# Patient Record
Sex: MALE | Race: WHITE
[De-identification: names, ages, dates, MRNs, and addresses within clinical notes are randomized per-mention and may not be internally consistent; named-entity substitution may affect disease eponyms.]

---

## 2017-04-17 ENCOUNTER — HOSPITAL ENCOUNTER (OUTPATIENT)
Dept: HOSPITAL 84 - D.CT | Age: 82
Discharge: HOME | End: 2017-04-17
Attending: FAMILY MEDICINE
Payer: MEDICARE

## 2017-04-17 VITALS — BODY MASS INDEX: 22.8 KG/M2
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
## 2017-04-17 DIAGNOSIS — S09.90XA: Primary | ICD-10-CM
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
## 2019-10-22 ENCOUNTER — HOSPITAL ENCOUNTER (INPATIENT)
Dept: HOSPITAL 84 - D.ER | Age: 84
LOS: 1 days | DRG: 64 | End: 2019-10-23
Attending: INTERNAL MEDICINE | Admitting: INTERNAL MEDICINE
Payer: MEDICARE

## 2019-10-22 VITALS — DIASTOLIC BLOOD PRESSURE: 102 MMHG | SYSTOLIC BLOOD PRESSURE: 153 MMHG

## 2019-10-22 VITALS
WEIGHT: 119.25 LBS | BODY MASS INDEX: 17.66 KG/M2 | BODY MASS INDEX: 17.66 KG/M2 | HEIGHT: 69 IN | WEIGHT: 119.25 LBS | HEIGHT: 69 IN

## 2019-10-22 VITALS — SYSTOLIC BLOOD PRESSURE: 168 MMHG | DIASTOLIC BLOOD PRESSURE: 109 MMHG

## 2019-10-22 VITALS — DIASTOLIC BLOOD PRESSURE: 118 MMHG | SYSTOLIC BLOOD PRESSURE: 179 MMHG

## 2019-10-22 VITALS — DIASTOLIC BLOOD PRESSURE: 73 MMHG | SYSTOLIC BLOOD PRESSURE: 110 MMHG

## 2019-10-22 VITALS — DIASTOLIC BLOOD PRESSURE: 102 MMHG | SYSTOLIC BLOOD PRESSURE: 159 MMHG

## 2019-10-22 VITALS — DIASTOLIC BLOOD PRESSURE: 108 MMHG | SYSTOLIC BLOOD PRESSURE: 160 MMHG

## 2019-10-22 VITALS — SYSTOLIC BLOOD PRESSURE: 109 MMHG | DIASTOLIC BLOOD PRESSURE: 75 MMHG

## 2019-10-22 VITALS — DIASTOLIC BLOOD PRESSURE: 105 MMHG | SYSTOLIC BLOOD PRESSURE: 174 MMHG

## 2019-10-22 VITALS — SYSTOLIC BLOOD PRESSURE: 148 MMHG | DIASTOLIC BLOOD PRESSURE: 98 MMHG

## 2019-10-22 DIAGNOSIS — R40.2113: ICD-10-CM

## 2019-10-22 DIAGNOSIS — R40.2333: ICD-10-CM

## 2019-10-22 DIAGNOSIS — R50.9: ICD-10-CM

## 2019-10-22 DIAGNOSIS — R40.2213: ICD-10-CM

## 2019-10-22 DIAGNOSIS — I10: ICD-10-CM

## 2019-10-22 DIAGNOSIS — E78.5: ICD-10-CM

## 2019-10-22 DIAGNOSIS — J96.01: ICD-10-CM

## 2019-10-22 DIAGNOSIS — I62.9: Primary | ICD-10-CM

## 2019-10-22 LAB
ALBUMIN SERPL-MCNC: 3.9 G/DL (ref 3.4–5)
ALP SERPL-CCNC: 105 U/L (ref 46–116)
ALT SERPL-CCNC: 27 U/L (ref 10–68)
AMPHETAMINES UR QL SCN: NEGATIVE QUAL
ANION GAP SERPL CALC-SCNC: 10.2 MMOL/L (ref 8–16)
APPEARANCE UR: CLEAR
APTT BLD: 25.5 SECONDS (ref 22.8–39.4)
BARBITURATES UR QL SCN: NEGATIVE QUAL
BASOPHILS NFR BLD AUTO: 0.3 % (ref 0–2)
BENZODIAZ UR QL SCN: NEGATIVE QUAL
BILIRUB SERPL-MCNC: 0.81 MG/DL (ref 0.2–1.3)
BILIRUB SERPL-MCNC: NEGATIVE MG/DL
BUN SERPL-MCNC: 8 MG/DL (ref 7–18)
BZE UR QL SCN: NEGATIVE QUAL
CALCIUM SERPL-MCNC: 8.4 MG/DL (ref 8.5–10.1)
CANNABINOIDS UR QL SCN: NEGATIVE QUAL
CHLORIDE SERPL-SCNC: 100 MMOL/L (ref 98–107)
CK MB SERPL-MCNC: 1.8 U/L (ref 0–3.6)
CK SERPL-CCNC: 132 UL (ref 21–232)
CO2 SERPL-SCNC: 31.3 MMOL/L (ref 21–32)
COLOR UR: YELLOW
CREAT SERPL-MCNC: 0.6 MG/DL (ref 0.6–1.3)
EOSINOPHIL NFR BLD: 4.8 % (ref 0–7)
ERYTHROCYTE [DISTWIDTH] IN BLOOD BY AUTOMATED COUNT: 13.5 % (ref 11.5–14.5)
GLOBULIN SER-MCNC: 3.8 G/L
GLUCOSE SERPL-MCNC: 142 MG/DL (ref 74–106)
GLUCOSE SERPL-MCNC: NEGATIVE MG/DL
HCT VFR BLD CALC: 41.4 % (ref 42–54)
HGB BLD-MCNC: 13.6 G/DL (ref 13.5–17.5)
IMM GRANULOCYTES NFR BLD: 0.3 % (ref 0–5)
INR PPP: 0.98 (ref 0.85–1.17)
KETONES UR STRIP-MCNC: NEGATIVE MG/DL
LYMPHOCYTES NFR BLD AUTO: 26.7 % (ref 15–50)
MAGNESIUM SERPL-MCNC: 2.2 MG/DL (ref 1.8–2.4)
MCH RBC QN AUTO: 33.3 PG (ref 26–34)
MCHC RBC AUTO-ENTMCNC: 32.9 G/DL (ref 31–37)
MCV RBC: 101.5 FL (ref 80–100)
MONOCYTES NFR BLD: 9.8 % (ref 2–11)
NEUTROPHILS NFR BLD AUTO: 58.1 % (ref 40–80)
NITRITE UR-MCNC: NEGATIVE MG/ML
OPIATES UR QL SCN: NEGATIVE QUAL
OSMOLALITY SERPL CALC.SUM OF ELEC: 275 MOSM/KG (ref 275–300)
PCP UR QL SCN: NEGATIVE QUAL
PH UR STRIP: 8 [PH] (ref 5–6)
PLATELET # BLD: 141 10X3/UL (ref 130–400)
PMV BLD AUTO: 8.7 FL (ref 7.4–10.4)
POTASSIUM SERPL-SCNC: 3.5 MMOL/L (ref 3.5–5.1)
PROT SERPL-MCNC: 7.7 G/DL (ref 6.4–8.2)
PROT UR-MCNC: NEGATIVE MG/DL
PROTHROMBIN TIME: 12.5 SECONDS (ref 11.6–15)
RBC # BLD AUTO: 4.08 10X6/UL (ref 4.2–6.1)
SODIUM SERPL-SCNC: 138 MMOL/L (ref 136–145)
SP GR UR STRIP: 1.01 (ref 1–1.02)
TROPONIN I SERPL-MCNC: < 0.017 NG/ML (ref 0–0.06)
TSH SERPL-ACNC: 1.3 UIU/ML (ref 0.36–3.74)
UROBILINOGEN UR-MCNC: NORMAL MG/DL
WBC # BLD AUTO: 6.2 10X3/UL (ref 4.8–10.8)

## 2019-10-22 PROCEDURE — 5A1935Z RESPIRATORY VENTILATION, LESS THAN 24 CONSECUTIVE HOURS: ICD-10-PCS | Performed by: FAMILY MEDICINE

## 2019-10-22 PROCEDURE — 0BH17EZ INSERTION OF ENDOTRACHEAL AIRWAY INTO TRACHEA, VIA NATURAL OR ARTIFICIAL OPENING: ICD-10-PCS | Performed by: FAMILY MEDICINE

## 2019-10-22 NOTE — NUR
spoke with christiano dang to terminate life support and call back with time of
cardiac expiration for possible tissue screening

## 2019-10-22 NOTE — NUR
NARCAN ADMINISTERED WITH NO CHANGE IN MENTAL STATUS.  DR LANDIN AT BEDSIDE AND
WILL NOW PREPARE TO INTUBATE.

## 2019-10-22 NOTE — NUR
RECEIVED PT ON BED BY [facility] FROM ER. PT INTUBATED, SEE FLOWSHEET FOR
VENT SETTINGS. ADMISSION ASSESSMENT COMPLETE. L HAND PIV SALINE LOCKED, R HAND
PIV PATENT, SEE IV FLOW SHEET. B/P 153/102, HR 78, O2 100%. PT UNRESPOSIVE,
SEDATED. WILL CONTINUE PLAN OF CARE.

## 2019-10-22 NOTE — NUR
BILATERAL SOFT WRIST RESTRAINTS APPLIED.  FLOW SHEET BEGAN.  DR DEVINE HERE TO
SEE PT AND SPEAK TO SPOUSE.

## 2019-10-22 NOTE — NUR
PT ARRIVED VIA EMS WITH AMS.  PT ARRIVED WITH AGONAL RESPIRATIONS AND
UNRESPONSIVE.  EMS STATES HIS CONDITION WORSENED WITH RESPIRATONS EN ROUTE. 
OXYGEN ON PT AT 6 LITERS.  IV SL TO LEFT HAND IN PLACE.  MED LIST DOES NOT SHOW
BLOOD THINNERS, ONLY ASA 81MG. EMS STATES PT WAS ALERT AND ORIENTED AND
ACTUALLY DROVE TO [place] AND BACK TWICE TODAY.  AT APPROX 1800 WIFE STATES
HE WENT TO THE BEDROOM AND AT 1820 WENT TO CHECK ON HIM AND HE WAS SEVERLY
ALTERED AND UNABLE TO AWAKEN PATIENT.

## 2019-10-22 NOTE — NUR
SPOKE WITH ER, THEY ARE BRINGING PT'S WIFE TO VISIT. SHE IS STILL DISTRAUGHT
AND WILL MORE THAN LIKELY NOT BE ABLE TO ANSWER MANY QUESTIONS. WILL ATEMPT TO
GET MEDICAL HX FROM FAMILY.

## 2019-10-23 VITALS — SYSTOLIC BLOOD PRESSURE: 160 MMHG | DIASTOLIC BLOOD PRESSURE: 109 MMHG

## 2019-10-23 VITALS — DIASTOLIC BLOOD PRESSURE: 113 MMHG | SYSTOLIC BLOOD PRESSURE: 188 MMHG

## 2019-10-23 VITALS — SYSTOLIC BLOOD PRESSURE: 165 MMHG | DIASTOLIC BLOOD PRESSURE: 107 MMHG

## 2019-10-23 VITALS — DIASTOLIC BLOOD PRESSURE: 110 MMHG | SYSTOLIC BLOOD PRESSURE: 168 MMHG

## 2019-10-23 VITALS — SYSTOLIC BLOOD PRESSURE: 121 MMHG | DIASTOLIC BLOOD PRESSURE: 85 MMHG

## 2019-10-23 VITALS — DIASTOLIC BLOOD PRESSURE: 109 MMHG | SYSTOLIC BLOOD PRESSURE: 174 MMHG

## 2019-10-23 VITALS — SYSTOLIC BLOOD PRESSURE: 114 MMHG | DIASTOLIC BLOOD PRESSURE: 82 MMHG

## 2019-10-23 VITALS — DIASTOLIC BLOOD PRESSURE: 90 MMHG | SYSTOLIC BLOOD PRESSURE: 131 MMHG

## 2019-10-23 VITALS — SYSTOLIC BLOOD PRESSURE: 171 MMHG | DIASTOLIC BLOOD PRESSURE: 114 MMHG

## 2019-10-23 VITALS — SYSTOLIC BLOOD PRESSURE: 139 MMHG | DIASTOLIC BLOOD PRESSURE: 96 MMHG

## 2019-10-23 VITALS — DIASTOLIC BLOOD PRESSURE: 95 MMHG | SYSTOLIC BLOOD PRESSURE: 128 MMHG

## 2019-10-23 VITALS — DIASTOLIC BLOOD PRESSURE: 90 MMHG | SYSTOLIC BLOOD PRESSURE: 135 MMHG

## 2019-10-23 NOTE — NUR
REPORT RECIEVED, SHIFT ASSESSMENT COMPLETE, PT IS UNRESPONSIVE ON VENT, ON 40%
FIO2 WITH 98% O2 SAT, ALL PPP, VSS, WILL CON'T TO MONITOR

## 2019-10-23 NOTE — [facility]
CASE MANAGEMENT DISCHARGE SUMMARY
 
 
PATIENT: FARZANEH DELEON                 UNIT: [id number]
ACCOUNT#: [id number]                       ADM DATE: 10/22/19
AGE: 88     : 31  SEX: M            ROOM/BED: D.2309    
AUTHOR: BECKY HOLMAN                             PHYSICIAN:                               
 
REFERRING PHYSICIAN: JORDIN LORA MD               
DATE OF SERVICE: 10/23/19
Discharge Plan
 
 
Patient Name: FARZANEH DELEON
[facility]
Encounter #: [id number]
Medical Record #: [id number]
: 1931
Planned Disposition: 
Anticipated Discharge Date: 
 
Discharge Date: 10/23/2019
Expected LOS: 
Initial Reviewer: [email or web address]
Initial Review Date: 10/23/2019
Generated: 10/23/19   4:11 pm 
Comments
 
DCP- Discharge Planning
 
Updated by [email or web address]: Keshia Peterson on 10/23/19   2:09 pm CT
CM was notified that patient had  and was registered with [employer] [employer] 
whole body donation. Family had gave nursing a contact number to call.  CM 
called and answered questions related to patient and death along with family 
contact numbers. Danielle stated that they would contact [facility] to come and  body.
  
 
 
 
 
 
 
 
 
Last DP export: 10/23/19   1:52 
Patient Name: FARZANEH DELEON
 
Encounter #: [id number]
Page [id number]
 
 
 
 
 
Electronically Signed by BECKY HOLMAN on 10/23/19 at 1511
 
 
 
 
 
 
**All edits/amendments must be made on the electronic document**
 
DICTATION DATE: 10/23/19 1511     : ASAD  10/23/19 1511     
RPT#: [id number]                                DC DATE:10/23/19
                                               STATUS: DIS IN  
[facility]
[address] [place] [address]
***END OF REPORT***

## 2019-10-23 NOTE — NUR
MARYELLEN GILLETTE, PT B/P 188/113, INFORMED OF EMESIS, ORDERS RECIEVED,
GIVEN ZOFRAN 8 MG IVP, FAMILY @ BEDSIDE

## 2019-10-23 NOTE — [facility]ECARE
CASE MANAGEMENT DISCHARGE SUMMARY
 
 
PATIENT: FARZANEH DELEON                 UNIT: [id number]
ACCOUNT#: [id number]                       ADM DATE: 10/22/19
AGE: 88     : 31  SEX: M            ROOM/BED: D.2309    
AUTHOR: BECKY HOLMAN                             PHYSICIAN:                               
 
REFERRING PHYSICIAN: JORDIN LORA MD               
DATE OF SERVICE: 10/23/19
Discharge Plan
 
 
Patient Name: FARZANEH DELEON
[facility]
Encounter #: [id number]
Medical Record #: [id number]
: 1931
Planned Disposition: 
Anticipated Discharge Date: 
 
Discharge Date: 10/23/2019
Expected LOS: 
Initial Reviewer: [email or web address]
Initial Review Date: 10/23/2019
Generated: 10/23/19   3:52 pm 
  
 
 
 
 
 
 
 
Patient Name: FARZANEH DELEON
 
Encounter #: [id number]
Page [id number]
 
 
 
 
 
Electronically Signed by BECKY HOLMAN on 10/23/19 at 1452
 
 
 
 
 
 
**All edits/amendments must be made on the electronic document**
 
DICTATION DATE: 10/23/19 1452     : ASAD  10/23/19 1452     
RPT#: [id number]                                DC DATE:10/23/19
                                               STATUS: DIS IN  
[facility]
[address] [place] [address]
***END OF REPORT***

## 2019-10-23 NOTE — NUR
PT UNRESPONSIVE, PUPILS FIXED AND DILATED, REMAINS ON VENT, WILL CONT TO
MONITOR, PT EMESISED AGAIN, CLEANED PER STAFF

## 2024-10-22 NOTE — NUR
BACK FROM CT.  RECONNECTED TO ALL MONITORS.  NO CHANGE IN MENTAL STATUS. minutes on the discharge service.